# Patient Record
Sex: MALE
[De-identification: names, ages, dates, MRNs, and addresses within clinical notes are randomized per-mention and may not be internally consistent; named-entity substitution may affect disease eponyms.]

---

## 2017-01-20 ENCOUNTER — APPOINTMENT (OUTPATIENT)
Dept: INTERNAL MEDICINE | Facility: CLINIC | Age: 72
End: 2017-01-20

## 2017-01-20 VITALS
OXYGEN SATURATION: 98 % | WEIGHT: 179 LBS | SYSTOLIC BLOOD PRESSURE: 138 MMHG | DIASTOLIC BLOOD PRESSURE: 80 MMHG | TEMPERATURE: 98.3 F | HEART RATE: 94 BPM | BODY MASS INDEX: 27.22 KG/M2

## 2017-01-24 LAB
ALBUMIN SERPL ELPH-MCNC: 4.7 G/DL
ALP BLD-CCNC: 72 U/L
ALT SERPL-CCNC: 17 U/L
ANION GAP SERPL CALC-SCNC: 18 MMOL/L
AST SERPL-CCNC: 18 U/L
BASOPHILS # BLD AUTO: 0.01 K/UL
BASOPHILS NFR BLD AUTO: 0.1 %
BILIRUB SERPL-MCNC: 0.6 MG/DL
BUN SERPL-MCNC: 15 MG/DL
CALCIUM SERPL-MCNC: 9.6 MG/DL
CHLORIDE SERPL-SCNC: 100 MMOL/L
CO2 SERPL-SCNC: 24 MMOL/L
CREAT SERPL-MCNC: 0.93 MG/DL
CRP SERPL-MCNC: <0.2 MG/DL
EOSINOPHIL # BLD AUTO: 0.12 K/UL
EOSINOPHIL NFR BLD AUTO: 1.6 %
GLUCOSE SERPL-MCNC: 84 MG/DL
HCT VFR BLD CALC: 47.5 %
HGB BLD-MCNC: 16.1 G/DL
IMM GRANULOCYTES NFR BLD AUTO: 0.1 %
LYMPHOCYTES # BLD AUTO: 2.19 K/UL
LYMPHOCYTES NFR BLD AUTO: 29.3 %
MAN DIFF?: NORMAL
MCHC RBC-ENTMCNC: 32.3 PG
MCHC RBC-ENTMCNC: 33.9 GM/DL
MCV RBC AUTO: 95.2 FL
MONOCYTES # BLD AUTO: 0.77 K/UL
MONOCYTES NFR BLD AUTO: 10.3 %
NEUTROPHILS # BLD AUTO: 4.38 K/UL
NEUTROPHILS NFR BLD AUTO: 58.6 %
PLATELET # BLD AUTO: 219 K/UL
POTASSIUM SERPL-SCNC: 4.4 MMOL/L
PROT SERPL-MCNC: 6.8 G/DL
RBC # BLD: 4.99 M/UL
RBC # FLD: 13.1 %
SODIUM SERPL-SCNC: 142 MMOL/L
TSH SERPL-ACNC: 1.32 UIU/ML
WBC # FLD AUTO: 7.48 K/UL

## 2017-11-10 ENCOUNTER — APPOINTMENT (OUTPATIENT)
Dept: INTERNAL MEDICINE | Facility: CLINIC | Age: 72
End: 2017-11-10
Payer: MEDICARE

## 2017-11-10 VITALS
WEIGHT: 184 LBS | DIASTOLIC BLOOD PRESSURE: 80 MMHG | BODY MASS INDEX: 27.57 KG/M2 | HEIGHT: 68.5 IN | SYSTOLIC BLOOD PRESSURE: 140 MMHG

## 2017-11-10 DIAGNOSIS — F06.4 ANXIETY DISORDER DUE TO KNOWN PHYSIOLOGICAL CONDITION: ICD-10-CM

## 2017-11-10 DIAGNOSIS — F41.0 ANXIETY DISORDER DUE TO KNOWN PHYSIOLOGICAL CONDITION: ICD-10-CM

## 2017-11-10 PROCEDURE — 99214 OFFICE O/P EST MOD 30 MIN: CPT

## 2018-01-26 ENCOUNTER — APPOINTMENT (OUTPATIENT)
Dept: INTERNAL MEDICINE | Facility: CLINIC | Age: 73
End: 2018-01-26
Payer: MEDICARE

## 2018-01-26 VITALS
HEART RATE: 85 BPM | OXYGEN SATURATION: 100 % | DIASTOLIC BLOOD PRESSURE: 88 MMHG | WEIGHT: 179.8 LBS | SYSTOLIC BLOOD PRESSURE: 139 MMHG | TEMPERATURE: 98.6 F | BODY MASS INDEX: 27.25 KG/M2 | HEIGHT: 68 IN

## 2018-01-26 VITALS — SYSTOLIC BLOOD PRESSURE: 122 MMHG | DIASTOLIC BLOOD PRESSURE: 80 MMHG

## 2018-01-26 DIAGNOSIS — Z87.898 PERSONAL HISTORY OF OTHER SPECIFIED CONDITIONS: ICD-10-CM

## 2018-01-26 DIAGNOSIS — R23.2 FLUSHING: ICD-10-CM

## 2018-01-26 DIAGNOSIS — Z01.818 ENCOUNTER FOR OTHER PREPROCEDURAL EXAMINATION: ICD-10-CM

## 2018-01-26 PROCEDURE — 90670 PCV13 VACCINE IM: CPT

## 2018-01-26 PROCEDURE — G0009: CPT

## 2018-01-26 PROCEDURE — 93000 ELECTROCARDIOGRAM COMPLETE: CPT

## 2018-01-26 PROCEDURE — 36415 COLL VENOUS BLD VENIPUNCTURE: CPT

## 2018-01-26 PROCEDURE — G0439: CPT

## 2018-02-06 LAB
ALBUMIN SERPL ELPH-MCNC: 4.7 G/DL
ALP BLD-CCNC: 69 U/L
ALT SERPL-CCNC: 19 U/L
ANION GAP SERPL CALC-SCNC: 18 MMOL/L
AST SERPL-CCNC: 21 U/L
BASOPHILS # BLD AUTO: 0.03 K/UL
BASOPHILS NFR BLD AUTO: 0.5 %
BILIRUB SERPL-MCNC: 0.7 MG/DL
BUN SERPL-MCNC: 16 MG/DL
CALCIUM SERPL-MCNC: 9.7 MG/DL
CHLORIDE SERPL-SCNC: 102 MMOL/L
CHOLEST SERPL-MCNC: 174 MG/DL
CHOLEST/HDLC SERPL: 3 RATIO
CO2 SERPL-SCNC: 22 MMOL/L
CREAT SERPL-MCNC: 1.06 MG/DL
EOSINOPHIL # BLD AUTO: 0.1 K/UL
EOSINOPHIL NFR BLD AUTO: 1.5 %
GLUCOSE SERPL-MCNC: 96 MG/DL
HCT VFR BLD CALC: 48.2 %
HDLC SERPL-MCNC: 58 MG/DL
HGB BLD-MCNC: 16.3 G/DL
IMM GRANULOCYTES NFR BLD AUTO: 0.5 %
LDLC SERPL CALC-MCNC: 104 MG/DL
LYMPHOCYTES # BLD AUTO: 2.01 K/UL
LYMPHOCYTES NFR BLD AUTO: 30.2 %
MAN DIFF?: NORMAL
MCHC RBC-ENTMCNC: 32.2 PG
MCHC RBC-ENTMCNC: 33.8 GM/DL
MCV RBC AUTO: 95.3 FL
MONOCYTES # BLD AUTO: 0.42 K/UL
MONOCYTES NFR BLD AUTO: 6.3 %
NEUTROPHILS # BLD AUTO: 4.07 K/UL
NEUTROPHILS NFR BLD AUTO: 61 %
PLATELET # BLD AUTO: 187 K/UL
POTASSIUM SERPL-SCNC: 4.4 MMOL/L
PROT SERPL-MCNC: 7.4 G/DL
PSA SERPL-MCNC: 3.75 NG/ML
RBC # BLD: 5.06 M/UL
RBC # FLD: 13.3 %
SODIUM SERPL-SCNC: 142 MMOL/L
TESTOST SERPL-MCNC: 202.2 NG/DL
TRIGL SERPL-MCNC: 62 MG/DL
WBC # FLD AUTO: 6.66 K/UL

## 2019-02-01 ENCOUNTER — APPOINTMENT (OUTPATIENT)
Dept: INTERNAL MEDICINE | Facility: CLINIC | Age: 74
End: 2019-02-01
Payer: MEDICARE

## 2019-02-01 VITALS
TEMPERATURE: 98.1 F | HEIGHT: 68 IN | SYSTOLIC BLOOD PRESSURE: 148 MMHG | BODY MASS INDEX: 27.49 KG/M2 | DIASTOLIC BLOOD PRESSURE: 90 MMHG | OXYGEN SATURATION: 98 % | HEART RATE: 84 BPM | WEIGHT: 181.38 LBS

## 2019-02-01 VITALS — SYSTOLIC BLOOD PRESSURE: 132 MMHG | DIASTOLIC BLOOD PRESSURE: 78 MMHG

## 2019-02-01 DIAGNOSIS — R55 SYNCOPE AND COLLAPSE: ICD-10-CM

## 2019-02-01 DIAGNOSIS — B00.1 HERPESVIRAL VESICULAR DERMATITIS: ICD-10-CM

## 2019-02-01 DIAGNOSIS — Z23 ENCOUNTER FOR IMMUNIZATION: ICD-10-CM

## 2019-02-01 PROCEDURE — 99213 OFFICE O/P EST LOW 20 MIN: CPT | Mod: 25

## 2019-02-01 PROCEDURE — 90732 PPSV23 VACC 2 YRS+ SUBQ/IM: CPT

## 2019-02-01 PROCEDURE — G0009: CPT

## 2019-02-01 PROCEDURE — 36415 COLL VENOUS BLD VENIPUNCTURE: CPT

## 2019-02-01 PROCEDURE — G0439: CPT

## 2019-02-01 PROCEDURE — 93000 ELECTROCARDIOGRAM COMPLETE: CPT

## 2019-02-01 NOTE — PHYSICAL EXAM
[No Acute Distress] : no acute distress [Well Nourished] : well nourished [Well Developed] : well developed [Well-Appearing] : well-appearing [Normal Voice/Communication] : normal voice/communication [Normal Sclera/Conjunctiva] : normal sclera/conjunctiva [PERRL] : pupils equal round and reactive to light [Normal Outer Ear/Nose] : the outer ears and nose were normal in appearance [Normal Oropharynx] : the oropharynx was normal [Normal TMs] : both tympanic membranes were normal [Normal Nasal Mucosa] : the nasal mucosa was normal [No JVD] : no jugular venous distention [Supple] : supple [No Lymphadenopathy] : no lymphadenopathy [Thyroid Normal, No Nodules] : the thyroid was normal and there were no nodules present [No Respiratory Distress] : no respiratory distress  [Clear to Auscultation] : lungs were clear to auscultation bilaterally [No Accessory Muscle Use] : no accessory muscle use [Normal Rate] : normal rate  [Regular Rhythm] : with a regular rhythm [Normal S1, S2] : normal S1 and S2 [No Murmur] : no murmur heard [No Carotid Bruits] : no carotid bruits [No Abdominal Bruit] : a ~M bruit was not heard ~T in the abdomen [Pedal Pulses Present] : the pedal pulses are present [No Extremity Clubbing/Cyanosis] : no extremity clubbing/cyanosis [No Palpable Aorta] : no palpable aorta [Normal Appearance] : normal in appearance [No Nipple Discharge] : no nipple discharge [No Axillary Lymphadenopathy] : no axillary lymphadenopathy [Soft] : abdomen soft [Non Tender] : non-tender [Non-distended] : non-distended [No Masses] : no abdominal mass palpated [No HSM] : no HSM [Normal Bowel Sounds] : normal bowel sounds [No Hernias] : no hernias [No Stool to Guaiac] : no stool to guaiac [Normal Sphincter Tone] : normal sphincter tone [Normal Supraclavicular Nodes] : no supraclavicular lymphadenopathy [Normal Axillary Nodes] : no axillary lymphadenopathy [Normal Posterior Cervical Nodes] : no posterior cervical lymphadenopathy [Normal Femoral Nodes] : no femoral lymphadenopathy [Normal Anterior Cervical Nodes] : no anterior cervical lymphadenopathy [Normal Inguinal Nodes] : no inguinal lymphadenopathy [No CVA Tenderness] : no CVA  tenderness [No Spinal Tenderness] : no spinal tenderness [No Joint Swelling] : no joint swelling [Grossly Normal Strength/Tone] : grossly normal strength/tone [No Rash] : no rash [Normal Gait] : normal gait [Coordination Grossly Intact] : coordination grossly intact [No Focal Deficits] : no focal deficits [Deep Tendon Reflexes (DTR)] : deep tendon reflexes were 2+ and symmetric [Speech Grossly Normal] : speech grossly normal [Memory Grossly Normal] : memory grossly normal [Normal Affect] : the affect was normal [Alert and Oriented x3] : oriented to person, place, and time [Normal Mood] : the mood was normal [Normal Insight/Judgement] : insight and judgment were intact [Stool Occult Blood] : stool negative for occult blood [de-identified] : Varicose veins with trace edema

## 2019-02-01 NOTE — HEALTH RISK ASSESSMENT
[Very Good] : ~his/her~  mood as very good [No falls in past year] : Patient reported no falls in the past year [0] : 2) Feeling down, depressed, or hopeless: Not at all (0) [None] : None [Alone] : lives alone [Employed] : employed [College] : College [Single] : single [Feels Safe at Home] : Feels safe at home [Fully functional (bathing, dressing, toileting, transferring, walking, feeding)] : Fully functional (bathing, dressing, toileting, transferring, walking, feeding) [Fully functional (using the telephone, shopping, preparing meals, housekeeping, doing laundry, using] : Fully functional and needs no help or supervision to perform IADLs (using the telephone, shopping, preparing meals, housekeeping, doing laundry, using transportation, managing medications and managing finances) [] : No [de-identified] : social [Change in mental status noted] : No change in mental status noted [Language] : denies difficulty with language [Behavior] : denies difficulty with behavior [Learning/Retaining New Information] : denies difficulty learning/retaining new information [Handling Complex Tasks] : denies difficulty handling complex tasks [Reasoning] : denies difficulty with reasoning [Spatial Ability and Orientation] : denies difficulty with spatial ability and orientation [Sexually Active] : not sexually active [High Risk Behavior] : no high risk behavior [ColonoscopyDate] : 2011

## 2019-02-01 NOTE — HISTORY OF PRESENT ILLNESS
[FreeTextEntry1] : \par Comprehensive annual physical examination and syncope several months ago [de-identified] : \par \par The patient is a 73-year-old male with history of isolated elevated blood pressure whitecoat phenomena, overweight, BPH, his decreased libido with erectile dysfunction, PVCs on EKG and herpes labialis who presents for follow-up patient feels well denies chest pain shortness of breath palpitations lightheadedness several months ago urinating passed out urgent care center next day EKG chest x-ray normal treated with Z-Lopez presently denies chest pain shortness of breath dyspnea exertion palpitation lightheadedness

## 2019-02-01 NOTE — ASSESSMENT
[FreeTextEntry1] : \par \par \par She is a 73-year-old male with history of whitecoat phenomena isolated elevated blood pressure, basal cell skin cancer rosacea, herpes labialis BPH, who presents for comprehensive annual physical examination\par \par 1.  Syncope\par Probable vasovagal/dehydration normal EKG\par No further syncope past few months\par Observe if recurs further workup will be required\par \par 2.  Cough\par Chronic sinus drip versus GERD\par Excess lifestyle modifications omeprazole 20 histamine\par Follow-up in 6 weeks\par \par 3.  Cholesterolemia \par counseled on diet check lipid profile consider statin\par \par 4.  Blood pressure\par Whitecoat phenomena\par Blood pressure 132/78\par \par #5 herpes labialis\par Valtrex as needed\par \par #6 BPH\par Minimal symptoms check PSA\par \par 7.Health maintenance\par Pneumococcal vaccine\par Schedule colonoscopy\par Check routine labs\par Follow-up in 4-6 months

## 2019-02-25 LAB
ALBUMIN SERPL ELPH-MCNC: 4.5 G/DL
ALP BLD-CCNC: 68 U/L
ALT SERPL-CCNC: 17 U/L
ANION GAP SERPL CALC-SCNC: 11 MMOL/L
AST SERPL-CCNC: 22 U/L
BASOPHILS # BLD AUTO: 0.02 K/UL
BASOPHILS NFR BLD AUTO: 0.3 %
BILIRUB SERPL-MCNC: 0.8 MG/DL
BUN SERPL-MCNC: 14 MG/DL
CALCIUM SERPL-MCNC: 9.8 MG/DL
CHLORIDE SERPL-SCNC: 101 MMOL/L
CHOLEST SERPL-MCNC: 177 MG/DL
CHOLEST/HDLC SERPL: 3.5 RATIO
CO2 SERPL-SCNC: 28 MMOL/L
CREAT SERPL-MCNC: 1.06 MG/DL
EOSINOPHIL # BLD AUTO: 0.1 K/UL
EOSINOPHIL NFR BLD AUTO: 1.4 %
GLUCOSE SERPL-MCNC: 104 MG/DL
HCT VFR BLD CALC: 47.3 %
HDLC SERPL-MCNC: 51 MG/DL
HGB BLD-MCNC: 16.4 G/DL
IMM GRANULOCYTES NFR BLD AUTO: 0.1 %
LDLC SERPL CALC-MCNC: 115 MG/DL
LYMPHOCYTES # BLD AUTO: 2.25 K/UL
LYMPHOCYTES NFR BLD AUTO: 30.6 %
MAN DIFF?: NORMAL
MCHC RBC-ENTMCNC: 31.7 PG
MCHC RBC-ENTMCNC: 34.7 GM/DL
MCV RBC AUTO: 91.5 FL
MONOCYTES # BLD AUTO: 0.44 K/UL
MONOCYTES NFR BLD AUTO: 6 %
NEUTROPHILS # BLD AUTO: 4.53 K/UL
NEUTROPHILS NFR BLD AUTO: 61.6 %
PLATELET # BLD AUTO: 186 K/UL
POTASSIUM SERPL-SCNC: 4.8 MMOL/L
PROT SERPL-MCNC: 7.2 G/DL
PSA SERPL-MCNC: 3.71 NG/ML
RBC # BLD: 5.17 M/UL
RBC # FLD: 13.4 %
SODIUM SERPL-SCNC: 140 MMOL/L
TRIGL SERPL-MCNC: 57 MG/DL
WBC # FLD AUTO: 7.35 K/UL

## 2019-08-07 ENCOUNTER — APPOINTMENT (OUTPATIENT)
Dept: PULMONOLOGY | Facility: CLINIC | Age: 74
End: 2019-08-07
Payer: MEDICARE

## 2019-08-07 VITALS
OXYGEN SATURATION: 97 % | WEIGHT: 188 LBS | SYSTOLIC BLOOD PRESSURE: 147 MMHG | TEMPERATURE: 98.8 F | DIASTOLIC BLOOD PRESSURE: 81 MMHG | HEART RATE: 100 BPM | HEIGHT: 68 IN | BODY MASS INDEX: 28.49 KG/M2

## 2019-08-07 DIAGNOSIS — R05 COUGH: ICD-10-CM

## 2019-08-07 PROCEDURE — 99204 OFFICE O/P NEW MOD 45 MIN: CPT | Mod: 25

## 2019-08-07 PROCEDURE — ZZZZZ: CPT

## 2019-08-07 PROCEDURE — 94060 EVALUATION OF WHEEZING: CPT

## 2019-08-07 PROCEDURE — 94726 PLETHYSMOGRAPHY LUNG VOLUMES: CPT

## 2019-08-07 PROCEDURE — 94729 DIFFUSING CAPACITY: CPT

## 2019-08-07 RX ORDER — PANTOPRAZOLE 40 MG/1
40 TABLET, DELAYED RELEASE ORAL DAILY
Qty: 45 | Refills: 0 | Status: ACTIVE | COMMUNITY
Start: 2019-08-07 | End: 1900-01-01

## 2019-08-07 RX ORDER — ELASTIC BANDAGE 1"X2.2YD
2300-700 BANDAGE TOPICAL
Qty: 1 | Refills: 0 | Status: ACTIVE | COMMUNITY
Start: 2019-08-07 | End: 1900-01-01

## 2019-08-07 NOTE — REVIEW OF SYSTEMS
[Cough] : cough [Reflux] : reflux [Fever] : no fever [Chills] : no chills [Fatigue] : no fatigue [Recent Wt Loss (___ Lbs)] : no recent weight loss [Dry Eyes] : no dryness of the eyes [Eye Irritation] : no ~T irritation of the eyes [Sputum] : not coughing up ~M sputum [Hemoptysis] : no hemoptysis [Dyspnea] : no dyspnea [Chest Tightness] : no chest tightness [Pleuritic Pain] : no pleuritic pain [Frequent URIs] : no frequent upper respiratory infections [Wheezing] : no wheezing [Hay Fever] : no hay fever [Itchy Eyes] : no itching of ~T the eyes [Heartburn] : no heartburn [FreeTextEntry1] : rest of ROS negative

## 2019-08-07 NOTE — HISTORY OF PRESENT ILLNESS
[FreeTextEntry1] : 74 year old male, never smoker was referred to pulmonary clinic by Dr. Padilla for intermittent cough and abnormal CT chest.\par Patient is c/o cough which started 3-4 months back. Cough was associated with nasal, sinus and chest congestion. Cough was aggravated by lying flat, eating food and blowing nose. Patient has episode of cough, which woke him up at night time. He coughed up brown color phlegm with spot of blood in it. Patient at this time is c/o post nasal drip, occasional cough, constant clearing of his throat with metallic taste in mouth. He is also c/o GERD. Denying fever, weight loss, chest pain, SOB or wheezing.\par CT chest was done on 8/2/19 which showed RUL ground glass opacity (1.2 cm) and basilar fibrotic changes.

## 2019-08-07 NOTE — DISCUSSION/SUMMARY
[FreeTextEntry1] : 74 year old male, never smoker with no exposure to pets or occupational hazard with h/o pneumonia (at age of 11 years and at age of 41 years after chemical exposure) with cough and ground glass nodule on CT chest.\par \par Oxygen saturation 97% on RA at rest\par \par Review:\par CT chest (8/2/19): RUL 1.2 cm ground glass opacity. Bi-basilar minimal fibrotic/atelectatic changes\par PFT (8/7/19): FEv1 84 (Pre), FEv1/FVC 79 (pre), TLC 88, DLCO 98, no reversibility\par \par A/P\par (1) Cough:\par - Predominantly due to post nasal drip from allergic rhinitis. GERD may be additional factor for occasionally (as per history)\par - Flonase nasal spray twice daily and nasal irrigation twice daily (video showed, written instruction given)\par - Protonix 40 mg daily for 6 weeks and d/c\par \par (2) Ground-glass opacity:\par - 1.2 cm RUL nodule. Patient never smoked but sister has history of breast cancer. Plan to repeat CT chest in 3 month for follow up\par - B/L lower lobe minimal fibrotic changes are most likely due to past pneumonia episodes. Although, possibility of early ILD can not be ruled out.\par - PFT today was normal. Plan for follow up PFT and imaging in future for monitoring.

## 2019-08-07 NOTE — CONSULT LETTER
[Dear  ___] : Dear  [unfilled], [Consult Letter:] : I had the pleasure of evaluating your patient, [unfilled]. [Please see my note below.] : Please see my note below. [Consult Closing:] : Thank you very much for allowing me to participate in the care of this patient.  If you have any questions, please do not hesitate to contact me. [FreeTextEntry3] : Sincerely\par \par Dominguez Carter MD FCCP\par Pulmonary and Critical Care\par [facility]\par

## 2019-08-07 NOTE — PHYSICAL EXAM
[General Appearance - Well Developed] : well developed [General Appearance - Well Nourished] : well nourished [Normal Conjunctiva] : the conjunctiva exhibited no abnormalities [Eyelids - No Xanthelasma] : the eyelids demonstrated no xanthelasmas [Normal Oropharynx] : normal oropharynx [Neck Appearance] : the appearance of the neck was normal [Heart Sounds] : normal S1 and S2 [Murmurs] : no murmurs present [Auscultation Breath Sounds / Voice Sounds] : lungs were clear to auscultation bilaterally [Abdomen Soft] : soft [Abdomen Tenderness] : non-tender [Abnormal Walk] : normal gait [Gait - Sufficient For Exercise Testing] : the gait was sufficient for exercise testing [Nail Clubbing] : no clubbing of the fingernails [Cyanosis, Localized] : no localized cyanosis [Skin Turgor] : normal skin turgor [] : no rash [Sensation] : the sensory exam was normal to light touch and pinprick [No Focal Deficits] : no focal deficits [Oriented To Time, Place, And Person] : oriented to person, place, and time [Affect] : the affect was normal [Low Lying Soft Palate] : no low lying soft palate [Erythema] : no erythema of the pharynx

## 2019-10-07 ENCOUNTER — OTHER (OUTPATIENT)
Age: 74
End: 2019-10-07

## 2019-11-05 ENCOUNTER — OUTPATIENT (OUTPATIENT)
Dept: OUTPATIENT SERVICES | Facility: HOSPITAL | Age: 74
LOS: 1 days | End: 2019-11-05

## 2019-11-05 ENCOUNTER — APPOINTMENT (OUTPATIENT)
Dept: CT IMAGING | Facility: CLINIC | Age: 74
End: 2019-11-05
Payer: MEDICARE

## 2019-11-05 PROCEDURE — 71250 CT THORAX DX C-: CPT | Mod: 26

## 2020-02-03 ENCOUNTER — FORM ENCOUNTER (OUTPATIENT)
Age: 75
End: 2020-02-03

## 2020-02-04 ENCOUNTER — OUTPATIENT (OUTPATIENT)
Dept: OUTPATIENT SERVICES | Facility: HOSPITAL | Age: 75
LOS: 1 days | End: 2020-02-04

## 2020-02-04 ENCOUNTER — APPOINTMENT (OUTPATIENT)
Dept: CT IMAGING | Facility: CLINIC | Age: 75
End: 2020-02-04
Payer: MEDICARE

## 2020-02-04 PROCEDURE — 71250 CT THORAX DX C-: CPT | Mod: 26

## 2020-04-09 ENCOUNTER — NEW REFERRAL (OUTPATIENT)
Dept: URBAN - METROPOLITAN AREA CLINIC 87 | Facility: CLINIC | Age: 75
End: 2020-04-09

## 2020-04-09 DIAGNOSIS — H53.2: ICD-10-CM

## 2020-04-09 DIAGNOSIS — H35.712: ICD-10-CM

## 2020-04-09 DIAGNOSIS — H43.813: ICD-10-CM

## 2020-04-09 DIAGNOSIS — H33.301: ICD-10-CM

## 2020-04-09 DIAGNOSIS — H35.373: ICD-10-CM

## 2020-04-09 DIAGNOSIS — Z96.1: ICD-10-CM

## 2020-04-09 PROCEDURE — 99204 OFFICE O/P NEW MOD 45 MIN: CPT

## 2020-04-09 PROCEDURE — 92201 OPSCPY EXTND RTA DRAW UNI/BI: CPT

## 2020-04-09 ASSESSMENT — VISUAL ACUITY
OD_CC: 20/20-1
OS_CC: 20/30

## 2020-04-09 ASSESSMENT — TONOMETRY
OD_IOP_MMHG: 10
OS_IOP_MMHG: 10

## 2020-06-23 ENCOUNTER — APPOINTMENT (OUTPATIENT)
Dept: CT IMAGING | Facility: CLINIC | Age: 75
End: 2020-06-23
Payer: MEDICARE

## 2020-06-23 ENCOUNTER — OUTPATIENT (OUTPATIENT)
Dept: OUTPATIENT SERVICES | Facility: HOSPITAL | Age: 75
LOS: 1 days | End: 2020-06-23

## 2020-06-23 PROCEDURE — 71250 CT THORAX DX C-: CPT | Mod: 26

## 2020-07-24 ENCOUNTER — NON-APPOINTMENT (OUTPATIENT)
Age: 75
End: 2020-07-24

## 2020-07-24 ENCOUNTER — APPOINTMENT (OUTPATIENT)
Dept: INTERNAL MEDICINE | Facility: CLINIC | Age: 75
End: 2020-07-24
Payer: MEDICARE

## 2020-07-24 VITALS — SYSTOLIC BLOOD PRESSURE: 130 MMHG | DIASTOLIC BLOOD PRESSURE: 80 MMHG

## 2020-07-24 VITALS
DIASTOLIC BLOOD PRESSURE: 78 MMHG | WEIGHT: 181 LBS | SYSTOLIC BLOOD PRESSURE: 142 MMHG | HEART RATE: 82 BPM | BODY MASS INDEX: 27.43 KG/M2 | TEMPERATURE: 97.8 F | HEIGHT: 68 IN | OXYGEN SATURATION: 97 %

## 2020-07-24 DIAGNOSIS — K21.9 GASTRO-ESOPHAGEAL REFLUX DISEASE W/OUT ESOPHAGITIS: ICD-10-CM

## 2020-07-24 DIAGNOSIS — N52.9 MALE ERECTILE DYSFUNCTION, UNSPECIFIED: ICD-10-CM

## 2020-07-24 PROCEDURE — G0439: CPT

## 2020-07-24 PROCEDURE — 36415 COLL VENOUS BLD VENIPUNCTURE: CPT

## 2020-07-24 PROCEDURE — 93000 ELECTROCARDIOGRAM COMPLETE: CPT

## 2020-07-24 NOTE — PHYSICAL EXAM
[Normal Sclera/Conjunctiva] : normal sclera/conjunctiva [Normal Outer Ear/Nose] : the outer ears and nose were normal in appearance [No Abdominal Bruit] : a ~M bruit was not heard ~T in the abdomen [No Carotid Bruits] : no carotid bruits [Pedal Pulses Present] : the pedal pulses are present [No Palpable Aorta] : no palpable aorta [No Masses] : no palpable masses [No Extremity Clubbing/Cyanosis] : no extremity clubbing/cyanosis [No Axillary Lymphadenopathy] : no axillary lymphadenopathy [No Nipple Discharge] : no nipple discharge [Normal Sphincter Tone] : normal sphincter tone [No Mass] : no mass [Penis Abnormality] : normal uncircumcised penis [Testes Tenderness] : no tenderness of the testes [Scrotum] : the scrotum was normal [Prostate Enlarged] : was enlarged [Normal] : no rash [Stool Occult Blood] : stool negative for occult blood [Prostate Nodule] : did not have a nodule [Prostate Tenderness] : was not tender [Prostate Fluctuant] : was not fluctuant [de-identified] : Diastasis rec.hi [de-identified] : Bilateral one plus edema with varicosities

## 2020-07-24 NOTE — HEALTH RISK ASSESSMENT
[Very Good] : ~his/her~  mood as very good [No] : No [No falls in past year] : Patient reported no falls in the past year [0] : 2) Feeling down, depressed, or hopeless: Not at all (0) [Patient reported colonoscopy was normal] : Patient reported colonoscopy was normal [HIV test declined] : HIV test declined [Hepatitis C test declined] : Hepatitis C test declined [Alone] : lives alone [None] : None [College] : College [Employed] : employed [] :  [# Of Children ___] : has [unfilled] children [Fully functional (bathing, dressing, toileting, transferring, walking, feeding)] : Fully functional (bathing, dressing, toileting, transferring, walking, feeding) [Feels Safe at Home] : Feels safe at home [Fully functional (using the telephone, shopping, preparing meals, housekeeping, doing laundry, using] : Fully functional and needs no help or supervision to perform IADLs (using the telephone, shopping, preparing meals, housekeeping, doing laundry, using transportation, managing medications and managing finances) [Reports normal functional visual acuity (ie: able to read med bottle)] : Reports normal functional visual acuity [Smoke Detector] : smoke detector [Carbon Monoxide Detector] : carbon monoxide detector [Seat Belt] :  uses seat belt [Safety elements used in home] : safety elements used in home [Sunscreen] : uses sunscreen [] : No [Audit-CScore] : 0 [de-identified] : Healthy [de-identified] : Walking [[id number]] : 0 [Change in mental status noted] : No change in mental status noted [Language] : denies difficulty with language [Behavior] : denies difficulty with behavior [Learning/Retaining New Information] : denies difficulty learning/retaining new information [Reasoning] : denies difficulty with reasoning [Handling Complex Tasks] : denies difficulty handling complex tasks [Spatial Ability and Orientation] : denies difficulty with spatial ability and orientation [Sexually Active] : not sexually active [High Risk Behavior] : no high risk behavior [Reports changes in vision] : Reports no changes in vision [Reports changes in hearing] : Reports no changes in hearing [Reports changes in dental health] : Reports no changes in dental health [Guns at Home] : no guns at home [TB Exposure] : is not being exposed to tuberculosis [Travel to Developing Areas] : does not  travel to developing areas [Caregiver Concerns] : does not have caregiver concerns [ColonoscopyDate] : 2011

## 2020-07-24 NOTE — REASON FOR VISIT
[Annual Wellness Visit] : an annual wellness visit [FreeTextEntry1] : Comprehensive annual physical examination follow-up for BPH ground glass capacity

## 2020-07-24 NOTE — ASSESSMENT
[FreeTextEntry1] : Patient is a 75-year-old male with history of BPH, erectile dysfunction, Gerd, ground glass capacity and venous insufficiency who presents for comprehensive annual physical examination\par \par 1.. Ground glass capacity right upper lobe\par stable by CT neck CT December 2020\par follow-up with pulmonary\par \par 2. Venous insufficiency\par discuss elevations and elastic stockings\par \par 3. Isolated elevated blood pressure\par normotensive today EKG no LVH\par \par 4 BPH\par minimal symptoms observe check PSA\par \par 5 erectile dysfunction\par not sexually active check testosterone borderline last visit\par \par 6. Health maintenance\par counseled on social distancing\par counseled on osteoporosis prevention check vitamin D level\par consider shingles vaccine\par check routine labs\par follow-up in 6 to 12 months\par flu shot the fall.\par \par Number seven Gerd/bitter taste mouth\par trial of Protonix or omeprazole and lifestyle modifications counseled

## 2020-07-31 LAB
25(OH)D3 SERPL-MCNC: 36.1 NG/ML
ALBUMIN SERPL ELPH-MCNC: 5 G/DL
ALP BLD-CCNC: 84 U/L
ALT SERPL-CCNC: 16 U/L
ANION GAP SERPL CALC-SCNC: 14 MMOL/L
AST SERPL-CCNC: 17 U/L
BASOPHILS # BLD AUTO: 0.05 K/UL
BASOPHILS NFR BLD AUTO: 0.8 %
BILIRUB SERPL-MCNC: 1 MG/DL
BUN SERPL-MCNC: 10 MG/DL
CALCIUM SERPL-MCNC: 9.6 MG/DL
CHLORIDE SERPL-SCNC: 101 MMOL/L
CHOLEST SERPL-MCNC: 156 MG/DL
CHOLEST/HDLC SERPL: 3 RATIO
CO2 SERPL-SCNC: 26 MMOL/L
CREAT SERPL-MCNC: 1 MG/DL
EOSINOPHIL # BLD AUTO: 0.07 K/UL
EOSINOPHIL NFR BLD AUTO: 1.1 %
GLUCOSE SERPL-MCNC: 99 MG/DL
HCT VFR BLD CALC: 46.6 %
HDLC SERPL-MCNC: 53 MG/DL
HGB BLD-MCNC: 15.7 G/DL
IMM GRANULOCYTES NFR BLD AUTO: 0.6 %
LDLC SERPL CALC-MCNC: 90 MG/DL
LYMPHOCYTES # BLD AUTO: 1.33 K/UL
LYMPHOCYTES NFR BLD AUTO: 20 %
MAN DIFF?: NORMAL
MCHC RBC-ENTMCNC: 31.5 PG
MCHC RBC-ENTMCNC: 33.7 GM/DL
MCV RBC AUTO: 93.4 FL
MONOCYTES # BLD AUTO: 0.53 K/UL
MONOCYTES NFR BLD AUTO: 8 %
NEUTROPHILS # BLD AUTO: 4.62 K/UL
NEUTROPHILS NFR BLD AUTO: 69.5 %
PLATELET # BLD AUTO: 195 K/UL
POTASSIUM SERPL-SCNC: 4.2 MMOL/L
PROT SERPL-MCNC: 6.7 G/DL
PSA SERPL-MCNC: 4.86 NG/ML
RBC # BLD: 4.99 M/UL
RBC # FLD: 12.3 %
SARS-COV-2 IGG SERPL IA-ACNC: 0.01 INDEX
SARS-COV-2 IGG SERPL QL IA: NEGATIVE
SODIUM SERPL-SCNC: 141 MMOL/L
TESTOST SERPL-MCNC: 250 NG/DL
TRIGL SERPL-MCNC: 68 MG/DL
WBC # FLD AUTO: 6.64 K/UL

## 2020-11-24 LAB — HEMOCCULT STL QL IA: NEGATIVE

## 2020-12-15 ENCOUNTER — OUTPATIENT (OUTPATIENT)
Dept: OUTPATIENT SERVICES | Facility: HOSPITAL | Age: 75
LOS: 1 days | End: 2020-12-15

## 2020-12-15 ENCOUNTER — APPOINTMENT (OUTPATIENT)
Dept: CT IMAGING | Facility: CLINIC | Age: 75
End: 2020-12-15
Payer: MEDICARE

## 2020-12-15 PROCEDURE — 71250 CT THORAX DX C-: CPT | Mod: 26,MH

## 2020-12-18 ENCOUNTER — APPOINTMENT (OUTPATIENT)
Dept: PULMONOLOGY | Facility: CLINIC | Age: 75
End: 2020-12-18
Payer: MEDICARE

## 2020-12-18 PROCEDURE — 99443: CPT | Mod: 95

## 2020-12-21 NOTE — REVIEW OF SYSTEMS
[Fever] : no fever [Fatigue] : no fatigue [Chills] : no chills [Poor Appetite] : no poor appetite [Dry Eyes] : no dry eyes [Eye Irritation] : no eye irritation [Cough] : no cough [Sputum] : no sputum [Orthopnea] : no orthopnea [TextBox_148] : rest of ROS negative

## 2020-12-21 NOTE — HISTORY OF PRESENT ILLNESS
[Home] : at home, [unfilled] , at the time of the visit. [Medical Office: ([facility])___] : at the medical office located in  [Verbal consent obtained from patient] : the patient, [unfilled] [TextBox_4] : 74 year old male, never smoker was referred to pulmonary clinic by Dr. Padilla for intermittent cough and abnormal CT chest.\par Patient is c/o cough which started 3-4 months back. Cough was associated with nasal, sinus and chest congestion. Cough was aggravated by lying flat, eating food and blowing nose. Patient has episode of cough, which woke him up at night time. He coughed up brown color phlegm with spot of blood in it. Patient at this time is c/o post nasal drip, occasional cough, constant clearing of his throat with metallic taste in mouth. He is also c/o GERD. Denying fever, weight loss, chest pain, SOB or wheezing.\par CT chest was done on 8/2/19 which showed RUL ground glass opacity (1.2 cm) and basilar fibrotic changes.\par \par 12/21/20:\par Patient called today after CT chest done recently. No new pulmonary complaint.

## 2020-12-21 NOTE — DISCUSSION/SUMMARY
[FreeTextEntry1] : 74 year old male, never smoker with no exposure to pets or occupational hazard with h/o pneumonia (at age of 11 years and at age of 41 years after chemical exposure) with cough (resolved after treatment of allergic rhinitis) and RUL ground glass nodule on CT chest.\par \par Oxygen saturation 97% on RA at rest\par \par Review:\par CT chest (12/20): Since 6/23/2020, no significant change in size of groundglass opacity at the right apex measuring up to 18 mm, which has been stable since 11/5/2019. Continued surveillance is recommended with repeat chest CT in 2 years for a total of 5 years surveillance, as per Fleischner society 2017 guidelines.\par PFT (8/7/19): FEv1 84 (Pre), FEv1/FVC 79 (pre), TLC 88, DLCO 98, no reversibility\par \par A/P\par RUL Ground-glass Nodule:\par - 1.8 cm RUL nodule stable since Nov 2019. Patient never smoked but sister has history of breast cancer. \par - B/L lower lobe minimal fibrotic changes are most likely due to past pneumonia episodes. Although, possibility of early ILD can not be ruled out. PFT in past was normal. \par - Plan for repeat CT chest in 1 year Dec 2021

## 2021-01-04 LAB — PSA SERPL-MCNC: 5.32 NG/ML

## 2021-02-26 LAB — PSA SERPL-MCNC: 4.19 NG/ML

## 2021-08-18 LAB — PSA SERPL-MCNC: 4.42 NG/ML

## 2021-11-22 ENCOUNTER — TRANSCRIPTION ENCOUNTER (OUTPATIENT)
Age: 76
End: 2021-11-22

## 2021-12-21 ENCOUNTER — APPOINTMENT (OUTPATIENT)
Dept: PULMONOLOGY | Facility: CLINIC | Age: 76
End: 2021-12-21
Payer: MEDICARE

## 2021-12-21 VITALS
SYSTOLIC BLOOD PRESSURE: 167 MMHG | HEART RATE: 70 BPM | DIASTOLIC BLOOD PRESSURE: 91 MMHG | TEMPERATURE: 98.1 F | BODY MASS INDEX: 27.67 KG/M2 | OXYGEN SATURATION: 96 % | WEIGHT: 182 LBS

## 2021-12-21 PROCEDURE — 99214 OFFICE O/P EST MOD 30 MIN: CPT

## 2021-12-28 NOTE — HISTORY OF PRESENT ILLNESS
[TextBox_4] : 74 year old male, never smoker was referred to pulmonary clinic by Dr. Padilla for intermittent cough and abnormal CT chest.\par Patient is c/o cough which started 3-4 months back. Cough was associated with nasal, sinus and chest congestion. Cough was aggravated by lying flat, eating food and blowing nose. Patient has episode of cough, which woke him up at night time. He coughed up brown color phlegm with spot of blood in it. Patient at this time is c/o post nasal drip, occasional cough, constant clearing of his throat with metallic taste in mouth. He is also c/o GERD. Denying fever, weight loss, chest pain, SOB or wheezing.\par CT chest was done on 8/2/19 which showed RUL ground glass opacity (1.2 cm) and basilar fibrotic changes.\par \par 12/21/20:\par Patient called today after CT chest done recently. No new pulmonary complaint.\par \par 12/28/21:\par No new complaint. CT chest was done.

## 2021-12-28 NOTE — DISCUSSION/SUMMARY
[FreeTextEntry1] : 74 year old male, never smoker with no exposure to pets or occupational hazard with h/o pneumonia (at age of 11 years and at age of 41 years after chemical exposure) with cough (resolved after treatment of allergic rhinitis) and RUL ground glass nodule on CT chest.\par \par Oxygen saturation 97% on RA at rest\par \par Review:\par CT chest (12/21): Stable RUL 18 mm lung nodule. Stable 5 mm RML nodule.\par CT chest (12/20): Since 6/23/2020, no significant change in size of groundglass opacity at the right apex measuring up to 18 mm, which has been stable since 11/5/2019. Continued surveillance is recommended with repeat chest CT in 2 years for a total of 5 years surveillance, as per Fleischner society 2017 guidelines.\par PFT (8/7/19): FEv1 84 (Pre), FEv1/FVC 79 (pre), TLC 88, DLCO 98, no reversibility\par \par A/P\par RUL Ground-glass Nodule:\par - 1.8 cm RUL nodule stable since Nov 2019. Patient never smoked but sister has history of breast cancer. \par - B/L lower lobe minimal fibrotic changes has been stable. PFT in past showed normal DLCO.\par - Plan for repeat CT chest in 2 year Dec 2023 (total of 5 years follow up and d/c if stable)

## 2021-12-28 NOTE — PHYSICAL EXAM
[No Acute Distress] : no acute distress [Well Nourished] : well nourished [Normal Oropharynx] : normal oropharynx [Normal Appearance] : normal appearance [No Neck Mass] : no neck mass [Normal Rate/Rhythm] : normal rate/rhythm [Normal S1, S2] : normal s1, s2 [No Resp Distress] : no resp distress [Clear to Auscultation Bilaterally] : clear to auscultation bilaterally

## 2022-01-28 ENCOUNTER — APPOINTMENT (OUTPATIENT)
Dept: INTERNAL MEDICINE | Facility: CLINIC | Age: 77
End: 2022-01-28
Payer: MEDICARE

## 2022-01-28 VITALS
SYSTOLIC BLOOD PRESSURE: 182 MMHG | HEART RATE: 69 BPM | HEIGHT: 68 IN | DIASTOLIC BLOOD PRESSURE: 90 MMHG | OXYGEN SATURATION: 97 % | WEIGHT: 174 LBS | BODY MASS INDEX: 26.37 KG/M2 | TEMPERATURE: 97.3 F

## 2022-01-28 VITALS — SYSTOLIC BLOOD PRESSURE: 162 MMHG | DIASTOLIC BLOOD PRESSURE: 88 MMHG

## 2022-01-28 DIAGNOSIS — J30.9 ALLERGIC RHINITIS, UNSPECIFIED: ICD-10-CM

## 2022-01-28 PROCEDURE — 99214 OFFICE O/P EST MOD 30 MIN: CPT

## 2022-01-28 RX ORDER — FLUTICASONE PROPIONATE 50 UG/1
50 SPRAY, METERED NASAL TWICE DAILY
Qty: 1 | Refills: 3 | Status: ACTIVE | COMMUNITY
Start: 2019-08-07 | End: 1900-01-01

## 2022-01-28 NOTE — ASSESSMENT
[FreeTextEntry1] : Patient is a 76-year-old male with history of anxiety, isolated elevated blood pressure, BPH with rising PSA, history of Gerd, ground glass capacity in lung, BPH and PVCs on EKG who presents for follow-up\par \par 1 isolated elevated blood pressure\par blood pressure elevated a secondary to anxiety stress\par blood pressure this morning was 134/70\par Will observe for now follow-up in one to two months\par \par 2. Metal taste male\par May be secondary allergic rhinitis\par start Flonase two sprays each nostril observe patient reluctant starting medication\par \par 3. Erectile dysfunction/decreased libido\par check testosterone\par \par 4.. BPH with rising PSA\par recheck PSA\par BPH minimally symptomatic patient reluctant start medication\par \par 5. Health maintenance\par schedule CPE in one month\par check routine labs\par refusing vaccines\par had COVID vaccine check titers to COVID

## 2022-01-28 NOTE — REVIEW OF SYSTEMS
[Postnasal Drip] : postnasal drip [Nasal Discharge] : nasal discharge [Hesitancy] : hesitancy [Nocturia] : nocturia [Frequency] : frequency [Impotence] : impotence [Negative] : Musculoskeletal [Earache] : no earache [Hearing Loss] : no hearing loss [Nosebleeds] : no nosebleeds [Sore Throat] : no sore throat [Hoarseness] : no hoarseness [Dysuria] : no dysuria [Incontinence] : no incontinence [Hematuria] : no hematuria [Poor Libido] : libido not poor

## 2022-01-28 NOTE — PHYSICAL EXAM
[Normal Sclera/Conjunctiva] : normal sclera/conjunctiva [Normal Outer Ear/Nose] : the outer ears and nose were normal in appearance [No Carotid Bruits] : no carotid bruits [No Abdominal Bruit] : a ~M bruit was not heard ~T in the abdomen [No Palpable Aorta] : no palpable aorta [Normal] : no CVA or spinal tenderness [de-identified] : Trace edema

## 2022-01-28 NOTE — HISTORY OF PRESENT ILLNESS
[FreeTextEntry1] : Follow-up for isolated elevated blood pressure rising PSA BPH [de-identified] : Patient is a 76-year-old male with history of isolated elevated blood pressure, anxiety/whitecoat phenomena, abnormal chest x-ray ground glass appearances, PVC, Gerd, BPH, basal cell carcinoma, allergic rhinitis who presents for follow-up patient upset had appointment that was not in the computer drove all the way from [place] two hours in the car for the appointment. Patient denies chest pain, shortness of breath distant exertion complains of metallic taste in mouth some nasal congestion denies fever, chills, lightheadedness,

## 2022-02-04 LAB
ALBUMIN SERPL ELPH-MCNC: 5 G/DL
ALP BLD-CCNC: 88 U/L
ALT SERPL-CCNC: 15 U/L
ANION GAP SERPL CALC-SCNC: 15 MMOL/L
AST SERPL-CCNC: 18 U/L
BASOPHILS # BLD AUTO: 0.03 K/UL
BASOPHILS NFR BLD AUTO: 0.5 %
BILIRUB SERPL-MCNC: 0.6 MG/DL
BUN SERPL-MCNC: 12 MG/DL
CALCIUM SERPL-MCNC: 9.8 MG/DL
CHLORIDE SERPL-SCNC: 102 MMOL/L
CHOLEST SERPL-MCNC: 184 MG/DL
CO2 SERPL-SCNC: 23 MMOL/L
COVID-19 NUCLEOCAPSID  GAM ANTIBODY INTERPRETATION: NEGATIVE
CREAT SERPL-MCNC: 0.87 MG/DL
EOSINOPHIL # BLD AUTO: 0.23 K/UL
EOSINOPHIL NFR BLD AUTO: 3.7 %
ESTIMATED AVERAGE GLUCOSE: 103 MG/DL
GLUCOSE SERPL-MCNC: 81 MG/DL
HBA1C MFR BLD HPLC: 5.2 %
HCT VFR BLD CALC: 48.4 %
HDLC SERPL-MCNC: 51 MG/DL
HGB BLD-MCNC: 16.3 G/DL
IMM GRANULOCYTES NFR BLD AUTO: 0.5 %
LDLC SERPL CALC-MCNC: 115 MG/DL
LYMPHOCYTES # BLD AUTO: 1.48 K/UL
LYMPHOCYTES NFR BLD AUTO: 23.9 %
MAN DIFF?: NORMAL
MCHC RBC-ENTMCNC: 32.3 PG
MCHC RBC-ENTMCNC: 33.7 GM/DL
MCV RBC AUTO: 95.8 FL
MONOCYTES # BLD AUTO: 0.51 K/UL
MONOCYTES NFR BLD AUTO: 8.2 %
NEUTROPHILS # BLD AUTO: 3.92 K/UL
NEUTROPHILS NFR BLD AUTO: 63.2 %
NONHDLC SERPL-MCNC: 132 MG/DL
PLATELET # BLD AUTO: 205 K/UL
POTASSIUM SERPL-SCNC: 4.5 MMOL/L
PROT SERPL-MCNC: 6.9 G/DL
PSA SERPL-MCNC: 4.62 NG/ML
RBC # BLD: 5.05 M/UL
RBC # FLD: 13 %
SARS-COV-2 AB SERPL QL IA: 0.07 INDEX
SODIUM SERPL-SCNC: 140 MMOL/L
TESTOST SERPL-MCNC: 224 NG/DL
TRIGL SERPL-MCNC: 86 MG/DL
WBC # FLD AUTO: 6.2 K/UL

## 2022-03-18 ENCOUNTER — APPOINTMENT (OUTPATIENT)
Dept: INTERNAL MEDICINE | Facility: CLINIC | Age: 77
End: 2022-03-18
Payer: MEDICARE

## 2022-03-18 ENCOUNTER — NON-APPOINTMENT (OUTPATIENT)
Age: 77
End: 2022-03-18

## 2022-03-18 VITALS
HEART RATE: 72 BPM | WEIGHT: 180 LBS | HEIGHT: 68 IN | OXYGEN SATURATION: 98 % | BODY MASS INDEX: 27.28 KG/M2 | SYSTOLIC BLOOD PRESSURE: 171 MMHG | TEMPERATURE: 97.3 F | DIASTOLIC BLOOD PRESSURE: 91 MMHG

## 2022-03-18 VITALS — DIASTOLIC BLOOD PRESSURE: 80 MMHG | SYSTOLIC BLOOD PRESSURE: 126 MMHG

## 2022-03-18 DIAGNOSIS — N40.0 BENIGN PROSTATIC HYPERPLASIA WITHOUT LOWER URINARY TRACT SYMPMS: ICD-10-CM

## 2022-03-18 DIAGNOSIS — I87.2 VENOUS INSUFFICIENCY (CHRONIC) (PERIPHERAL): ICD-10-CM

## 2022-03-18 DIAGNOSIS — R03.0 ELEVATED BLOOD-PRESSURE READING, W/OUT DIAGNOSIS OF HYPERTENSION: ICD-10-CM

## 2022-03-18 DIAGNOSIS — R91.8 OTHER NONSPECIFIC ABNORMAL FINDING OF LUNG FIELD: ICD-10-CM

## 2022-03-18 DIAGNOSIS — R97.20 ELEVATED PROSTATE, SPECIFIC ANTIGEN [PSA]: ICD-10-CM

## 2022-03-18 DIAGNOSIS — Z00.00 ENCOUNTER FOR GENERAL ADULT MEDICAL EXAMINATION W/OUT ABNORMAL FINDINGS: ICD-10-CM

## 2022-03-18 DIAGNOSIS — I49.3 VENTRICULAR PREMATURE DEPOLARIZATION: ICD-10-CM

## 2022-03-18 PROCEDURE — 93000 ELECTROCARDIOGRAM COMPLETE: CPT

## 2022-03-18 PROCEDURE — G0439: CPT

## 2022-03-18 PROCEDURE — 36415 COLL VENOUS BLD VENIPUNCTURE: CPT

## 2022-03-18 PROCEDURE — 99213 OFFICE O/P EST LOW 20 MIN: CPT | Mod: 25

## 2022-03-18 NOTE — ASSESSMENT
[FreeTextEntry1] : Patient is a 76-year-old male with history of isolated elevated blood pressure, anxiety, BPH with elevated PSA, basal cell carcinoma status post mow surgery, history of ground glass appearance in lung, Gerd, allergic rhinitis who presents for comprehensive annual physical examination and complaint of bad taste in mouth\par \par 1. Bad taste in mouth\par trial of breath pantoprazole\par counseled on lifestyle modification rule out Gerd\par continue Flonase\par \par 2. Isolated elevated blood pressure/PVCs\par normal EKG no LVH\par blood pressure adequately controlled\par \par 3. BPH with elevated PSA\par mild elevation will observe check PSA in several months\par \par 4. Ground glass analog\par stable follow-up with pulmonary\par \par 5. Overweight\par counseled on diet and exercise\par \par 6 borderline hypercholesterolemia\par  on diet suggested stands patient reluctant will observe\par \par 7 health maintenance\par patient refusing colonoscopy or vehicle cards\par check routine labs done\par refuses any vaccine in current times.

## 2022-03-18 NOTE — PHYSICAL EXAM
[Normal Sclera/Conjunctiva] : normal sclera/conjunctiva [Normal Outer Ear/Nose] : the outer ears and nose were normal in appearance [No Carotid Bruits] : no carotid bruits [No Abdominal Bruit] : a ~M bruit was not heard ~T in the abdomen [Pedal Pulses Present] : the pedal pulses are present [No Edema] : there was no peripheral edema [No Palpable Aorta] : no palpable aorta [No Extremity Clubbing/Cyanosis] : no extremity clubbing/cyanosis [Normal Appearance] : normal in appearance [No Nipple Discharge] : no nipple discharge [No Axillary Lymphadenopathy] : no axillary lymphadenopathy [Soft] : abdomen soft [Non Tender] : non-tender [No Masses] : no abdominal mass palpated [No HSM] : no HSM [Normal Bowel Sounds] : normal bowel sounds [No Hernias] : no hernias [Penis Abnormality] : normal circumcised penis [Scrotum] : the scrotum was normal [Testes Tenderness] : no tenderness of the testes [Prostate Enlarged] : was enlarged [Normal] : normal gait, coordination grossly intact, no focal deficits and deep tendon reflexes were 2+ and symmetric [Prostate Nodule] : did not have a nodule [Prostate Tenderness] : was not tender [Prostate Fluctuant] : was not fluctuant [de-identified] : spider veins varicose veins [de-identified] : Diastsis recti

## 2022-03-18 NOTE — HEALTH RISK ASSESSMENT
[Very Good] : ~his/her~  mood as very good [Never] : Never [No falls in past year] : Patient reported no falls in the past year [0] : 2) Feeling down, depressed, or hopeless: Not at all (0) [Patient reported colonoscopy was normal] : Patient reported colonoscopy was normal [HIV test declined] : HIV test declined [Hepatitis C test declined] : Hepatitis C test declined [None] : None [Alone] : lives alone [Retired] : retired [College] : College [] :  [# Of Children ___] : has [unfilled] children [Feels Safe at Home] : Feels safe at home [Fully functional (bathing, dressing, toileting, transferring, walking, feeding)] : Fully functional (bathing, dressing, toileting, transferring, walking, feeding) [Fully functional (using the telephone, shopping, preparing meals, housekeeping, doing laundry, using] : Fully functional and needs no help or supervision to perform IADLs (using the telephone, shopping, preparing meals, housekeeping, doing laundry, using transportation, managing medications and managing finances) [Reports normal functional visual acuity (ie: able to read med bottle)] : Reports normal functional visual acuity [Smoke Detector] : smoke detector [Carbon Monoxide Detector] : carbon monoxide detector [Safety elements used in home] : safety elements used in home [Seat Belt] :  uses seat belt [Sunscreen] : uses sunscreen [de-identified] : Exercises several days a week [de-identified] : Eats well [[id number]] : 0 [Change in mental status noted] : No change in mental status noted [Language] : denies difficulty with language [Behavior] : denies difficulty with behavior [Learning/Retaining New Information] : denies difficulty learning/retaining new information [Handling Complex Tasks] : denies difficulty handling complex tasks [Reasoning] : denies difficulty with reasoning [Spatial Ability and Orientation] : denies difficulty with spatial ability and orientation [Sexually Active] : not sexually active [High Risk Behavior] : no high risk behavior [Reports changes in hearing] : Reports no changes in hearing [Reports changes in vision] : Reports no changes in vision [Reports changes in dental health] : Reports no changes in dental health [Guns at Home] : no guns at home [Travel to Developing Areas] : does not  travel to developing areas [TB Exposure] : is not being exposed to tuberculosis [Caregiver Concerns] : does not have caregiver concerns [ColonoscopyDate] : 2011

## 2022-03-18 NOTE — HISTORY OF PRESENT ILLNESS
[FreeTextEntry1] : Comprehensive annual physical examination follow-up for elevated PSA and bad taste in mouth [de-identified] : The patient is a 76-year-old male with history of isolated elevated blood pressure/whitecoat phenomena, anxiety/stress currently in personal lives, must ground glass severity of long stable, BPH with mild elevated PSA, neuropathic like feeling in legs and seasonal allergies who presents for follow-up patient complains of persistent bad taste in the mouth on aided by Flonase denies chest pain, shortness of breath distant exertion but complains of recent stress and personal life.

## 2022-04-07 LAB — HEMOCCULT STL QL IA: NEGATIVE

## 2023-03-20 ENCOUNTER — FOLLOW UP (OUTPATIENT)
Dept: URBAN - METROPOLITAN AREA CLINIC 46 | Facility: CLINIC | Age: 78
End: 2023-03-20

## 2023-03-20 DIAGNOSIS — G43.109: ICD-10-CM

## 2023-03-20 DIAGNOSIS — H35.373: ICD-10-CM

## 2023-03-20 DIAGNOSIS — H53.2: ICD-10-CM

## 2023-03-20 DIAGNOSIS — H43.813: ICD-10-CM

## 2023-03-20 DIAGNOSIS — Z96.1: ICD-10-CM

## 2023-03-20 DIAGNOSIS — H35.712: ICD-10-CM

## 2023-03-20 DIAGNOSIS — H33.301: ICD-10-CM

## 2023-03-20 PROCEDURE — 92014 COMPRE OPH EXAM EST PT 1/>: CPT

## 2023-03-20 PROCEDURE — 92201 OPSCPY EXTND RTA DRAW UNI/BI: CPT

## 2023-03-20 PROCEDURE — 92134 CPTRZ OPH DX IMG PST SGM RTA: CPT

## 2023-03-20 ASSESSMENT — VISUAL ACUITY
OD_CC: 20/30
OS_CC: 20/20

## 2023-03-20 ASSESSMENT — TONOMETRY
OD_IOP_MMHG: 10
OS_IOP_MMHG: 9

## 2024-01-03 DIAGNOSIS — M54.50 LOW BACK PAIN, UNSPECIFIED: ICD-10-CM

## 2024-01-03 RX ORDER — TRAMADOL HYDROCHLORIDE 50 MG/1
50 TABLET, COATED ORAL DAILY
Qty: 3 | Refills: 0 | Status: ACTIVE | COMMUNITY
Start: 2024-01-03 | End: 1900-01-01

## 2024-01-10 DIAGNOSIS — M54.9 DORSALGIA, UNSPECIFIED: ICD-10-CM

## 2024-01-10 RX ORDER — CYCLOBENZAPRINE HYDROCHLORIDE 10 MG/1
10 TABLET, FILM COATED ORAL 3 TIMES DAILY
Qty: 15 | Refills: 1 | Status: ACTIVE | COMMUNITY
Start: 2024-01-10 | End: 1900-01-01

## 2024-01-17 ENCOUNTER — NON-APPOINTMENT (OUTPATIENT)
Age: 79
End: 2024-01-17

## 2024-01-25 ENCOUNTER — APPOINTMENT (OUTPATIENT)
Dept: PULMONOLOGY | Facility: CLINIC | Age: 79
End: 2024-01-25
Payer: MEDICARE

## 2024-01-25 DIAGNOSIS — R91.1 SOLITARY PULMONARY NODULE: ICD-10-CM

## 2024-01-25 PROCEDURE — 99214 OFFICE O/P EST MOD 30 MIN: CPT

## 2024-01-26 PROBLEM — R91.1 LUNG NODULE: Status: ACTIVE | Noted: 2020-01-30

## 2024-01-26 NOTE — PHYSICAL EXAM
[No Acute Distress] : no acute distress [Normal Oropharynx] : normal oropharynx [Normal Appearance] : normal appearance [No Resp Distress] : no resp distress [TextBox_2] : ***full physical exam not performed due to this being a virtual visit  Patient is c/o cough which started 3-4 months back. Cough was associated with nasal, sinus and chest congestion. Cough was aggravated by lying flat, eating food and blowing nose. Patient has episode of cough, which woke him up at night time. He coughed up brown color phlegm with spot of blood in it. Patient at this time is c/o post nasal drip, occasional cough, constant clearing of his throat with metallic taste in mouth. He is also c/o GERD. Denying fever, weight loss, chest pain, SOB or wheezing.  CT chest was done on 8/2/19 which showed RUL ground glass opacity (1.2 cm) and basilar fibrotic changes.    12/21/20:  Patient called today after CT chest done recently. No new pulmonary complaint.    12/28/21:  No new complaint. CT chest was done.

## 2024-01-26 NOTE — ASSESSMENT
[FreeTextEntry1] : 78 year old male, never smoker with no exposure to pets or occupational hazard with h/o pneumonia (at age of 11 years and at age of 41 years after chemical exposure) with cough (resolved after treatment of allergic rhinitis) and RUL ground glass nodule on CT chest, following up today to discuss CT chest and PET/CT results.   Review: CT chest (12/20): Since 6/23/2020, no significant change in size of groundglass opacity at the right apex measuring up to 18 mm, which has been stable since 11/5/2019. Continued surveillance is recommended with repeat chest CT in 2 years for a total of 5 years surveillance, as per Fleischner society 2017 guidelines. PFT (8/7/19): FEv1 84 (Pre), FEv1/FVC 79 (pre), TLC 88, DLCO 98, no reversibility CT chest (12/21): Stable RUL 18 mm lung nodule. Stable 5 mm RML nodule. CT Chest 12/26/2023: increased size of R apical nodule  PET CT 1/17/2024: RUL groundglass nodule with low level FDG uptake. Given appearance, finding may be adenocarcinoma in situ/minimally invasive adenocarcinoma. Incidental prostatomegaly with FDG uptake, linear uptake within the distal esophagus, likely physiologic vs esophagitis.   RUL Ground-glass Nodule: - RUL nodule increased in size since 2021. PET scan with low-level FDG uptake. Patient never smoked but sister has history of breast cancer. - Referred to CT surgery to discuss resection and his surgical candidacy.

## 2024-01-26 NOTE — HISTORY OF PRESENT ILLNESS
[TextBox_4] : {Raul\} 74 year old male, never smoker was referred to pulmonary clinic by Dr. Padilla for intermittent cough and abnormal CT chest. Patient is c/o cough which started 3-4 months back. Cough was associated with nasal, sinus and chest congestion. Cough was aggravated by lying flat, eating food and blowing nose. Patient has episode of cough, which woke him up at night time. He coughed up brown color phlegm with spot of blood in it. Patient at this time is c/o post nasal drip, occasional cough, constant clearing of his throat with metallic taste in mouth. He is also c/o GERD. Denying fever, weight loss, chest pain, SOB or wheezing. CT chest was done on 8/2/19 which showed RUL ground glass opacity (1.2 cm) and basilar fibrotic changes.  12/21/20: Patient called today after CT chest done recently. No new pulmonary complaint.  12/28/21: No new complaint. CT chest was done.  {Oks\} 1/25/2024 Former patient of Dr. Carter's, following up today after having CT chest and PET/CT done.

## 2024-01-29 ENCOUNTER — TRANSCRIPTION ENCOUNTER (OUTPATIENT)
Age: 79
End: 2024-01-29

## 2024-03-19 ENCOUNTER — FOLLOW UP (OUTPATIENT)
Dept: URBAN - METROPOLITAN AREA CLINIC 87 | Facility: CLINIC | Age: 79
End: 2024-03-19

## 2024-03-19 DIAGNOSIS — G43.109: ICD-10-CM

## 2024-03-19 DIAGNOSIS — Z96.1: ICD-10-CM

## 2024-03-19 DIAGNOSIS — H53.2: ICD-10-CM

## 2024-03-19 DIAGNOSIS — H43.813: ICD-10-CM

## 2024-03-19 DIAGNOSIS — H35.373: ICD-10-CM

## 2024-03-19 DIAGNOSIS — H33.301: ICD-10-CM

## 2024-03-19 DIAGNOSIS — H35.712: ICD-10-CM

## 2024-03-19 PROCEDURE — 92134 CPTRZ OPH DX IMG PST SGM RTA: CPT

## 2024-03-19 PROCEDURE — 92201 OPSCPY EXTND RTA DRAW UNI/BI: CPT

## 2024-03-19 PROCEDURE — 92014 COMPRE OPH EXAM EST PT 1/>: CPT

## 2024-03-19 ASSESSMENT — VISUAL ACUITY
OD_CC: 20/60-2
OS_CC: 20/40

## 2024-03-19 ASSESSMENT — TONOMETRY
OD_IOP_MMHG: 11
OS_IOP_MMHG: 9